# Patient Record
Sex: FEMALE | Race: WHITE | NOT HISPANIC OR LATINO | Employment: UNEMPLOYED | URBAN - METROPOLITAN AREA
[De-identification: names, ages, dates, MRNs, and addresses within clinical notes are randomized per-mention and may not be internally consistent; named-entity substitution may affect disease eponyms.]

---

## 2017-04-11 ENCOUNTER — OFFICE VISIT (OUTPATIENT)
Dept: OBSTETRICS AND GYNECOLOGY | Facility: CLINIC | Age: 27
End: 2017-04-11

## 2017-04-11 VITALS
BODY MASS INDEX: 25.11 KG/M2 | WEIGHT: 133 LBS | HEIGHT: 61 IN | HEART RATE: 96 BPM | SYSTOLIC BLOOD PRESSURE: 111 MMHG | DIASTOLIC BLOOD PRESSURE: 71 MMHG

## 2017-04-11 DIAGNOSIS — Z01.411 ENCOUNTER FOR GYNECOLOGICAL EXAMINATION WITH ABNORMAL FINDING: ICD-10-CM

## 2017-04-11 DIAGNOSIS — N76.1 CHRONIC VAGINITIS: ICD-10-CM

## 2017-04-11 DIAGNOSIS — Z01.419 PAP SMEAR, AS PART OF ROUTINE GYNECOLOGICAL EXAMINATION: Primary | ICD-10-CM

## 2017-04-11 PROCEDURE — 99385 PREV VISIT NEW AGE 18-39: CPT | Performed by: OBSTETRICS & GYNECOLOGY

## 2017-04-11 RX ORDER — ALBUTEROL SULFATE 90 UG/1
AEROSOL, METERED RESPIRATORY (INHALATION)
Refills: 0 | COMMUNITY
Start: 2017-02-06

## 2017-04-11 NOTE — PROGRESS NOTES
"Trista Bhatia is a 27 y.o. female annual exam. C/o vaginal discharge for over 1 yr- no itching or odor.    History of Present Illness    Patient is a 27 y.o. for annual exam. Patient is a new patient. Patient complains of mild vaginal discharge for over a year. Patient denies itching or odor. Patient is interested in IUD.     Health Maintenance   Topic Date Due   • TDAP/TD VACCINES (1 - Tdap) 01/27/2009   • INFLUENZA VACCINE  08/01/2016   • PAP SMEAR  04/11/2017       The following portions of the patient's history were reviewed and updated as appropriate: allergies, current medications, past family history, past medical history, past social history, past surgical history and problem list.    Review of Systems   Genitourinary: Positive for vaginal discharge. Negative for menstrual problem.   All other systems reviewed and are negative.      Vitals:    04/11/17 1000   BP: 111/71   Pulse: 96   Weight: 133 lb (60.3 kg)   Height: 61\" (154.9 cm)       Objective   Physical Exam   Constitutional: She is oriented to person, place, and time. She appears well-developed and well-nourished.   HENT:   Head: Normocephalic and atraumatic.   Eyes: Conjunctivae and EOM are normal.   Neck: Normal range of motion. Neck supple. No thyromegaly present.   Cardiovascular: Normal rate, regular rhythm and normal heart sounds.    Pulmonary/Chest: Effort normal and breath sounds normal. Right breast exhibits no mass, no nipple discharge and no tenderness. Left breast exhibits no mass, no nipple discharge and no tenderness.   Abdominal: Soft. Bowel sounds are normal. There is no hepatosplenomegaly. There is no tenderness. No hernia.   Genitourinary: Rectum normal, vagina normal and uterus normal. Rectal exam shows no external hemorrhoid. There is no rash, tenderness or lesion on the right labia. There is no rash, tenderness or lesion on the left labia. Uterus is not enlarged and not tender. Cervix exhibits no discharge. Right " adnexum displays no mass and no tenderness. Left adnexum displays no mass and no tenderness. No tenderness in the vagina. No vaginal discharge found.   Musculoskeletal: Normal range of motion. She exhibits no edema.   Lymphadenopathy:        Right: No inguinal adenopathy present.        Left: No inguinal adenopathy present.   Neurological: She is alert and oriented to person, place, and time.   Skin: Skin is warm and dry. No rash noted.   Psychiatric: She has a normal mood and affect.   Vitals reviewed.        Assessment/Plan   Brynn was seen today for gynecologic exam and vaginal discharge.    Diagnoses and all orders for this visit:    Pap smear, as part of routine gynecological examination  -     Pap IG, Rfx HPV ASCU    Chronic vaginitis  -     NuSwab VG+    Encounter for gynecological examination with abnormal finding    Will check cultures and follow up according. Risks/benefits of Mirena discussed including bleeding profile, PID, ectopic pregnancy, and uterine perforation. Patient to come in for insertion during her period.

## 2017-04-13 ENCOUNTER — TELEPHONE (OUTPATIENT)
Dept: OBSTETRICS AND GYNECOLOGY | Facility: CLINIC | Age: 27
End: 2017-04-13

## 2017-04-13 LAB
CONV .: NORMAL
CYTOLOGIST CVX/VAG CYTO: NORMAL
CYTOLOGY CVX/VAG DOC THIN PREP: NORMAL
DX ICD CODE: NORMAL
DX ICD CODE: NORMAL
HIV 1 & 2 AB SER-IMP: NORMAL
OTHER STN SPEC: NORMAL
PATH REPORT.FINAL DX SPEC: NORMAL
STAT OF ADQ CVX/VAG CYTO-IMP: NORMAL

## 2017-04-13 RX ORDER — FLUCONAZOLE 150 MG/1
150 TABLET ORAL ONCE
Qty: 1 TABLET | Refills: 0 | Status: SHIPPED | OUTPATIENT
Start: 2017-04-13 | End: 2017-04-13

## 2017-04-13 NOTE — TELEPHONE ENCOUNTER
----- Message from Codi Vides MD sent at 4/13/2017  8:38 AM EDT -----  Please call the patient regarding her abnormal result.  Patient has yeast on her pap smear. Diflucan was sent. It was otherwise normal. I am still waiting on her full culture results.

## 2017-04-15 LAB
A VAGINAE DNA VAG QL NAA+PROBE: ABNORMAL SCORE
BVAB2 DNA VAG QL NAA+PROBE: ABNORMAL SCORE
C ALBICANS DNA VAG QL NAA+PROBE: POSITIVE
C GLABRATA DNA VAG QL NAA+PROBE: NEGATIVE
C TRACH RRNA SPEC QL NAA+PROBE: NEGATIVE
MEGA1 DNA VAG QL NAA+PROBE: ABNORMAL SCORE
N GONORRHOEA RRNA SPEC QL NAA+PROBE: NEGATIVE
T VAGINALIS RRNA SPEC QL NAA+PROBE: NEGATIVE

## 2017-04-21 ENCOUNTER — PROCEDURE VISIT (OUTPATIENT)
Dept: OBSTETRICS AND GYNECOLOGY | Facility: CLINIC | Age: 27
End: 2017-04-21

## 2017-04-21 VITALS
SYSTOLIC BLOOD PRESSURE: 106 MMHG | BODY MASS INDEX: 25.11 KG/M2 | HEIGHT: 61 IN | WEIGHT: 133 LBS | DIASTOLIC BLOOD PRESSURE: 72 MMHG

## 2017-04-21 DIAGNOSIS — Z30.430 ENCOUNTER FOR IUD INSERTION: Primary | ICD-10-CM

## 2017-04-21 LAB
B-HCG UR QL: NEGATIVE
INTERNAL NEGATIVE CONTROL: NEGATIVE
INTERNAL POSITIVE CONTROL: POSITIVE
Lab: NORMAL

## 2017-04-21 PROCEDURE — 58300 INSERT INTRAUTERINE DEVICE: CPT | Performed by: OBSTETRICS & GYNECOLOGY

## 2017-04-21 PROCEDURE — 81025 URINE PREGNANCY TEST: CPT | Performed by: OBSTETRICS & GYNECOLOGY

## 2017-04-21 NOTE — PROGRESS NOTES
Procedure   Procedures   Here for Mirena insertion. Lot # [id number], exp 01/2019 ndc 35453-425-26    Procedure: Mirena intrauterine device insertion    Preoperative diagnosis: desires contraception    Postoperative diagnosis: desires contraception    Anesthesia: none    The risks, benefits, and alternatives to Mirena IUD were explained at length with the patient. All her questions were answered and consents were signed. Pregnancy test was confirmed to be negative.  The patient was placed in a dorsal lithotomy position on the examining table in [facility]. A speculum was inserted into the vagina and the cervix was brought into view. The cervix was prepped with Betadine. The anterior lip was grasped with a single-tooth tenaculum. The endometrial cavity was then sounded to 7 cm. The sealed Mirena package was opened and the Mirena was removed in a sterile fashion.  The upper edge of the depth setting the flange was set at a uterine sound measured. The  was then carefully advanced to the cervical canal into the uterus to the level of the fundus. This was then backed off about 1.5-2 cm to allow sufficient space for the arms to open. The slider was then retracted about 1 cm and the device was deployed. The device was then gently advanced to the fundus. The Mirena was then released by pulling the slider down all the way. The  was removed carefully from the uterus. The threads were then cut leaving 2-3 cm visible outside of the cervix.  The single-tooth tenaculum was removed from the anterior lip. Silver nitrate was applied to the tenaculum sites. Good hemostasis was noted. All other instruments were removed from the vagina. There were no complications, and the patient tolerated the procedure well with a minimal amount of discomfort. The patient was counseled about the need to return in 4 weeks for string check. She was counseled about the need to use a backup method of contraception such as condoms until her  post insertion exam was performed. The patient verbalized understanding that the Mirena will need to be removed/replaced after 5 years. The patient is counseled to contact us if she has any significant or increasing bleeding, pain, fever, chills, or other concerns. She is instructed to see a doctor right away if she believes that she may be pregnant at any time with the Mirena in place.    Assessment/Plan   Brynn was seen today for contraception.    Diagnoses and all orders for this visit:    Encounter for IUD insertion  -     POC Pregnancy, Urine  -     levonorgestrel (MIRENA) 20 MCG/24HR IUD; by Intrauterine route 1 (One) Time.                 Return in about 4 weeks (around 5/19/2017).

## 2017-05-22 ENCOUNTER — OFFICE VISIT (OUTPATIENT)
Dept: OBSTETRICS AND GYNECOLOGY | Facility: CLINIC | Age: 27
End: 2017-05-22

## 2017-05-22 VITALS
HEIGHT: 61 IN | WEIGHT: 139 LBS | DIASTOLIC BLOOD PRESSURE: 67 MMHG | SYSTOLIC BLOOD PRESSURE: 112 MMHG | BODY MASS INDEX: 26.24 KG/M2 | HEART RATE: 80 BPM

## 2017-05-22 DIAGNOSIS — Z30.431 IUD CHECK UP: Primary | ICD-10-CM

## 2017-05-22 DIAGNOSIS — N92.6 IRREGULAR BLEEDING: ICD-10-CM

## 2017-05-22 PROCEDURE — 99213 OFFICE O/P EST LOW 20 MIN: CPT | Performed by: OBSTETRICS & GYNECOLOGY

## 2017-05-22 RX ORDER — DIAZEPAM 5 MG/1
TABLET ORAL
Refills: 1 | COMMUNITY
Start: 2017-05-01

## 2017-11-21 ENCOUNTER — OFFICE VISIT (OUTPATIENT)
Dept: OBSTETRICS AND GYNECOLOGY | Facility: CLINIC | Age: 27
End: 2017-11-21

## 2017-11-21 VITALS
SYSTOLIC BLOOD PRESSURE: 115 MMHG | HEIGHT: 61 IN | HEART RATE: 85 BPM | DIASTOLIC BLOOD PRESSURE: 73 MMHG | BODY MASS INDEX: 29.07 KG/M2 | WEIGHT: 154 LBS

## 2017-11-21 DIAGNOSIS — R10.9 LEFT SIDED ABDOMINAL PAIN: Primary | ICD-10-CM

## 2017-11-21 DIAGNOSIS — Z30.431 IUD CHECK UP: ICD-10-CM

## 2017-11-21 PROCEDURE — 99213 OFFICE O/P EST LOW 20 MIN: CPT | Performed by: OBSTETRICS & GYNECOLOGY

## 2017-11-21 NOTE — PROGRESS NOTES
Subjective   Brynn Bhatia is a 27 y.o. female     History of Present Illness  Pt here for pain on left abdominal side.   Reports pain started about a week ago.  It is worse with coughing or laughing.  She has not noticed any difference with positional changes.  She denies any abnormal vaginal bleeding.  She has had a Mirena IUD inserted in 2017, and she has had no period since approximately 2 months following placement.  She reports mild radiation of the pain to the right side.  No radiation to the back.  She denies fevers or chills.  She denies any constipation, diarrhea, nausea, vomiting.  She has not had a new sexual partner and denies any risk factors for STDs.  She was told in the past she produced excess cervical mucus.  She denies any dysuria.  Did try nightly dose of ibuprofen but this did not change the symptoms.  She is happy with the Mirena IUD.  She did check for the strings last month and noted they seem to be normal.  She is in the middle of separation from her partner of 10 years.  Has an 8-year-old 3-year-old her having difficulty going through the separation    Reports her mom had cervical cancer in her 20s.  Also reports her grandmother recently  of ovarian cancer.  She is unsure what kind of ovarian cancer.  Her grandmother was in her 70s.    Past Medical History:   Diagnosis Date   • Asthma      History reviewed. No pertinent surgical history.  Social History   Substance Use Topics   • Smoking status: Never Smoker   • Smokeless tobacco: Never Used   • Alcohol use No     Family History   Problem Relation Age of Onset   • Seizures Mother    • Cervical cancer Mother    • Cervical cancer Maternal Grandmother          Review of Systems   Constitutional: Negative for chills, fever and unexpected weight change.   HENT: Negative for congestion, nosebleeds and sore throat.    Eyes: Negative for visual disturbance.   Respiratory: Negative for cough, chest tightness and shortness of breath.     Cardiovascular: Negative for chest pain and palpitations.   Gastrointestinal: Positive for abdominal pain (see HPI). Negative for constipation, diarrhea, nausea and vomiting.   Endocrine: Negative for polyuria.   Genitourinary: Positive for vaginal discharge (no odor). Negative for difficulty urinating, dyspareunia, dysuria, frequency, genital sores, hematuria, menstrual problem, pelvic pain, urgency, vaginal bleeding and vaginal pain.   Musculoskeletal: Negative for arthralgias and joint swelling.   Skin: Negative for color change and rash.   Neurological: Negative for dizziness, light-headedness and headaches.   Hematological: Does not bruise/bleed easily.   Psychiatric/Behavioral: Negative for dysphoric mood. The patient is not nervous/anxious.        Objective   Physical Exam   Constitutional: She is oriented to person, place, and time. She appears well-developed and well-nourished.   HENT:   Head: Normocephalic and atraumatic.   Eyes: EOM are normal. No scleral icterus.   Neck: Normal range of motion.   Cardiovascular: Normal rate.    Pulmonary/Chest: Effort normal. No respiratory distress.   Abdominal: Soft.   + straight leg raise test on left and bilaterally   Genitourinary: Uterus normal. There is no rash, tenderness or lesion on the right labia. There is no rash, tenderness or lesion on the left labia. Uterus is not deviated, not enlarged and not tender. Cervix exhibits discharge (IUD strings seen). Cervix exhibits no motion tenderness and no friability. Right adnexum displays no mass, no tenderness and no fullness. Left adnexum displays no mass, no tenderness and no fullness. Vaginal discharge found.   Neurological: She is alert and oriented to person, place, and time.   Skin: Skin is warm and dry.   Psychiatric: She has a normal mood and affect. Her behavior is normal.         Assessment/Plan   Problems Addressed this Visit        Other    IUD check up      Other Visit Diagnoses     Left sided abdominal  pain    -  Primary    Relevant Orders    NuSwab VG+ - Swab, Vagina       She was counseled on the possible etiologies of this pain.  Her exam was overall benign.  Given the exacerbation with coughing and laughing, as well as the positive straight leg test I suspect she might have a musculoskeletal component.  Given findings of vaginal discharge sent NuSwab to rule out cervicits.  Do not suspect PID based on exam.  We discussed that with the Mirena IUD can develop ovarian cysts.  Typically the cyst resolved in the course of her menstrual cycle.  Patient is nervous about the possibility of ovarian cysts given her family history.  I recommended a follow-up with a transvaginal ultrasound to both assess the ovaries and the position of the IUD.  Based on pelvic exam, the IUD strings are appropriate.  The meantime I did recommend a 3-5 day course scheduled NSAIDs.  He was given routine abdominal pain precautions including seek immediate medical evaluation with fever, acute onset of pain, intractable nausea and/or vomiting, other concerning sign or symptom.  If no resolution of pain in 4-6 weeks recommend follow up in office.    Follow up for annual exam April 2018

## 2017-11-26 RX ORDER — FLUCONAZOLE 150 MG/1
150 TABLET ORAL ONCE
Qty: 1 TABLET | Refills: 0 | Status: SHIPPED | OUTPATIENT
Start: 2017-11-26 | End: 2017-11-26

## 2017-11-27 ENCOUNTER — TELEPHONE (OUTPATIENT)
Dept: OBSTETRICS AND GYNECOLOGY | Facility: CLINIC | Age: 27
End: 2017-11-27

## 2017-11-27 NOTE — TELEPHONE ENCOUNTER
----- Message from Sigrid Lomax MD sent at 11/26/2017  7:40 PM EST -----  Please call patient and let her know her testing came back positive for yeast.  She may use a topical OTC medication if she prefers.  I will send one dose of diflucan to her pharmacy.  Thank you!

## 2017-11-28 ENCOUNTER — TELEPHONE (OUTPATIENT)
Dept: OBSTETRICS AND GYNECOLOGY | Facility: CLINIC | Age: 27
End: 2017-11-28

## 2017-11-28 ENCOUNTER — PROCEDURE VISIT (OUTPATIENT)
Dept: OBSTETRICS AND GYNECOLOGY | Facility: CLINIC | Age: 27
End: 2017-11-28

## 2017-11-28 DIAGNOSIS — N83.209 HEMORRHAGIC CYST OF OVARY: ICD-10-CM

## 2017-11-28 DIAGNOSIS — Z97.5 IUD (INTRAUTERINE DEVICE) IN PLACE: ICD-10-CM

## 2017-11-28 DIAGNOSIS — R10.2 PELVIC PAIN IN FEMALE: Primary | ICD-10-CM

## 2017-11-28 PROCEDURE — 76830 TRANSVAGINAL US NON-OB: CPT | Performed by: OBSTETRICS & GYNECOLOGY

## 2017-11-28 NOTE — TELEPHONE ENCOUNTER
Called patient to review ultrasound results.  Her transvaginal ultrasound showed a resolving left hemorrhagic ovarian cyst that was 14.6mm x 10.0mm and IUD in appropriate position, normal right ovary.  We discussed that typically these cysts will resolve over the next few cycles.  If pain worsens or fails to improve we should re-examine. Patient expressed understanding. No further questions at this time.

## 2017-12-04 ENCOUNTER — TELEPHONE (OUTPATIENT)
Dept: OBSTETRICS AND GYNECOLOGY | Facility: CLINIC | Age: 27
End: 2017-12-04

## 2017-12-04 NOTE — TELEPHONE ENCOUNTER
----- Message from Cynthia Reyer sent at 12/4/2017  8:55 AM EST -----  Pt called stating on Saturday she started having severe pain and it is still severe. She thinks it is related to her cyst. She says on a scale of 1-10, it ranges from a 9 to 10. Please advise. E [phone]    12/04/17 1:29 PM   If pain is that severe, she should go to ED for evaluation.  Typically with the size of her cyst (approx 1.5x1cm) the pain would not be this severe, so we need to make sure it is not happening for another reason.  Thanks!

## 2017-12-04 NOTE — TELEPHONE ENCOUNTER
----- Message from Cynthia Reyer sent at 12/4/2017  8:55 AM EST -----  Pt called stating on Saturday she started having severe pain and it is still severe. She thinks it is related to her cyst. She says on a scale of 1-10, it ranges from a 9 to 10. Please advise. E [phone]    12/04/17 5:21 PM   Patient reports that she has felt bloated and diffuse sharp, stabbing pain all over her abdomen.  She thinks she may have some swelling of her legs.  Felt decreased appetite.  No nausea.  Bowel movement yesterday was normal, no constipation.  Reports that she has been bleeding, changing a pad 2-3 times per day, pretty soiled but not saturated.  Has not had much bleeding since the IUD insertion.    Counseled patient that she should go to ED with nausea/vomiting, fever, severe pain, heavy bleeding or other concerning sign/symptom. She expresses understanding.  We discussed that sometimes cysts can rupture and cause pain but that is usually short lived.  I cautioned her that if alarming symptoms arise, she should seek immediate medical evaluation to make sure this is not ovarian torsion, appendicitis, or other concerning findings.  Offered patient evaluation in the office which she declines for now.

## 2017-12-05 ENCOUNTER — HOSPITAL ENCOUNTER (EMERGENCY)
Facility: HOSPITAL | Age: 27
Discharge: HOME | End: 2017-12-06
Attending: EMERGENCY MEDICINE | Admitting: EMERGENCY MEDICINE

## 2017-12-05 DIAGNOSIS — B96.89 BV (BACTERIAL VAGINOSIS): ICD-10-CM

## 2017-12-05 DIAGNOSIS — N92.6 IRREGULAR BLEEDING: ICD-10-CM

## 2017-12-05 DIAGNOSIS — R10.32 ABDOMINAL PAIN, ACUTE, LEFT LOWER QUADRANT: Primary | ICD-10-CM

## 2017-12-05 DIAGNOSIS — N76.0 BV (BACTERIAL VAGINOSIS): ICD-10-CM

## 2017-12-05 PROCEDURE — 85025 COMPLETE CBC W/AUTO DIFF WBC: CPT | Performed by: EMERGENCY MEDICINE

## 2017-12-05 PROCEDURE — 80053 COMPREHEN METABOLIC PANEL: CPT | Performed by: EMERGENCY MEDICINE

## 2017-12-05 PROCEDURE — 96361 HYDRATE IV INFUSION ADD-ON: CPT

## 2017-12-05 PROCEDURE — 81001 URINALYSIS AUTO W/SCOPE: CPT | Performed by: EMERGENCY MEDICINE

## 2017-12-05 PROCEDURE — 99283 EMERGENCY DEPT VISIT LOW MDM: CPT

## 2017-12-05 RX ADMIN — SODIUM CHLORIDE 1000 ML: 9 INJECTION, SOLUTION INTRAVENOUS at 23:55

## 2017-12-06 ENCOUNTER — APPOINTMENT (OUTPATIENT)
Dept: CT IMAGING | Facility: HOSPITAL | Age: 27
End: 2017-12-06

## 2017-12-06 VITALS
BODY MASS INDEX: 28.32 KG/M2 | WEIGHT: 150 LBS | DIASTOLIC BLOOD PRESSURE: 71 MMHG | SYSTOLIC BLOOD PRESSURE: 103 MMHG | TEMPERATURE: 96.7 F | RESPIRATION RATE: 18 BRPM | OXYGEN SATURATION: 100 % | HEIGHT: 61 IN | HEART RATE: 54 BPM

## 2017-12-06 LAB
ALBUMIN SERPL-MCNC: 4.2 G/DL (ref 3.5–5.2)
ALBUMIN/GLOB SERPL: 1.3 G/DL
ALP SERPL-CCNC: 71 U/L (ref 39–117)
ALT SERPL W P-5'-P-CCNC: 12 U/L (ref 1–33)
ANION GAP SERPL CALCULATED.3IONS-SCNC: 8.4 MMOL/L
AST SERPL-CCNC: 15 U/L (ref 1–32)
BACTERIA UR QL AUTO: NORMAL /HPF
BASOPHILS # BLD AUTO: 0.07 10*3/MM3 (ref 0–0.2)
BASOPHILS NFR BLD AUTO: 0.8 % (ref 0–1.5)
BILIRUB SERPL-MCNC: 0.3 MG/DL (ref 0.1–1.2)
BILIRUB UR QL STRIP: NEGATIVE
BUN BLD-MCNC: 15 MG/DL (ref 6–20)
BUN/CREAT SERPL: 16.1 (ref 7–25)
CALCIUM SPEC-SCNC: 9.1 MG/DL (ref 8.6–10.5)
CHLORIDE SERPL-SCNC: 104 MMOL/L (ref 98–107)
CLARITY UR: CLEAR
CO2 SERPL-SCNC: 29.6 MMOL/L (ref 22–29)
COLOR UR: YELLOW
CREAT BLD-MCNC: 0.93 MG/DL (ref 0.57–1)
DEPRECATED RDW RBC AUTO: 40.9 FL (ref 37–54)
EOSINOPHIL # BLD AUTO: 0.33 10*3/MM3 (ref 0–0.7)
EOSINOPHIL NFR BLD AUTO: 4 % (ref 0.3–6.2)
ERYTHROCYTE [DISTWIDTH] IN BLOOD BY AUTOMATED COUNT: 12 % (ref 11.7–13)
GFR SERPL CREATININE-BSD FRML MDRD: 72 ML/MIN/1.73
GLOBULIN UR ELPH-MCNC: 3.3 GM/DL
GLUCOSE BLD-MCNC: 84 MG/DL (ref 65–99)
GLUCOSE UR STRIP-MCNC: NEGATIVE MG/DL
HCT VFR BLD AUTO: 41.5 % (ref 35.6–45.5)
HGB BLD-MCNC: 13.5 G/DL (ref 11.9–15.5)
HGB UR QL STRIP.AUTO: ABNORMAL
HYALINE CASTS UR QL AUTO: NORMAL /LPF
IMM GRANULOCYTES # BLD: 0 10*3/MM3 (ref 0–0.03)
IMM GRANULOCYTES NFR BLD: 0 % (ref 0–0.5)
KETONES UR QL STRIP: NEGATIVE
LEUKOCYTE ESTERASE UR QL STRIP.AUTO: NEGATIVE
LYMPHOCYTES # BLD AUTO: 3.33 10*3/MM3 (ref 0.9–4.8)
LYMPHOCYTES NFR BLD AUTO: 39.9 % (ref 19.6–45.3)
MCH RBC QN AUTO: 30.4 PG (ref 26.9–32)
MCHC RBC AUTO-ENTMCNC: 32.5 G/DL (ref 32.4–36.3)
MCV RBC AUTO: 93.5 FL (ref 80.5–98.2)
MONOCYTES # BLD AUTO: 0.53 10*3/MM3 (ref 0.2–1.2)
MONOCYTES NFR BLD AUTO: 6.3 % (ref 5–12)
NEUTROPHILS # BLD AUTO: 4.09 10*3/MM3 (ref 1.9–8.1)
NEUTROPHILS NFR BLD AUTO: 49 % (ref 42.7–76)
NITRITE UR QL STRIP: NEGATIVE
PH UR STRIP.AUTO: 6.5 [PH] (ref 5–8)
PLATELET # BLD AUTO: 285 10*3/MM3 (ref 140–500)
PMV BLD AUTO: 10.1 FL (ref 6–12)
POTASSIUM BLD-SCNC: 3.6 MMOL/L (ref 3.5–5.2)
PROT SERPL-MCNC: 7.5 G/DL (ref 6–8.5)
PROT UR QL STRIP: NEGATIVE
RBC # BLD AUTO: 4.44 10*6/MM3 (ref 3.9–5.2)
RBC # UR: NORMAL /HPF
REF LAB TEST METHOD: NORMAL
SODIUM BLD-SCNC: 142 MMOL/L (ref 136–145)
SP GR UR STRIP: 1.02 (ref 1–1.03)
SQUAMOUS #/AREA URNS HPF: NORMAL /HPF
UROBILINOGEN UR QL STRIP: ABNORMAL
WBC NRBC COR # BLD: 8.35 10*3/MM3 (ref 4.5–10.7)
WBC UR QL AUTO: NORMAL /HPF

## 2017-12-06 PROCEDURE — 96375 TX/PRO/DX INJ NEW DRUG ADDON: CPT

## 2017-12-06 PROCEDURE — 0 IOPAMIDOL 61 % SOLUTION: Performed by: EMERGENCY MEDICINE

## 2017-12-06 PROCEDURE — 25010000002 ONDANSETRON PER 1 MG: Performed by: EMERGENCY MEDICINE

## 2017-12-06 PROCEDURE — 25010000002 KETOROLAC TROMETHAMINE PER 15 MG: Performed by: EMERGENCY MEDICINE

## 2017-12-06 PROCEDURE — 96374 THER/PROPH/DIAG INJ IV PUSH: CPT

## 2017-12-06 PROCEDURE — 74177 CT ABD & PELVIS W/CONTRAST: CPT

## 2017-12-06 RX ORDER — HYDROMORPHONE HYDROCHLORIDE 1 MG/ML
0.5 INJECTION, SOLUTION INTRAMUSCULAR; INTRAVENOUS; SUBCUTANEOUS ONCE
Status: DISCONTINUED | OUTPATIENT
Start: 2017-12-06 | End: 2017-12-06 | Stop reason: HOSPADM

## 2017-12-06 RX ORDER — ONDANSETRON 2 MG/ML
4 INJECTION INTRAMUSCULAR; INTRAVENOUS ONCE
Status: COMPLETED | OUTPATIENT
Start: 2017-12-06 | End: 2017-12-06

## 2017-12-06 RX ORDER — DICYCLOMINE HYDROCHLORIDE 10 MG/1
10 CAPSULE ORAL
Qty: 30 CAPSULE | Refills: 0 | Status: SHIPPED | OUTPATIENT
Start: 2017-12-06 | End: 2019-03-26

## 2017-12-06 RX ORDER — METRONIDAZOLE 500 MG/1
500 TABLET ORAL 2 TIMES DAILY
Qty: 14 TABLET | Refills: 0 | Status: SHIPPED | OUTPATIENT
Start: 2017-12-06 | End: 2019-03-26

## 2017-12-06 RX ORDER — DOCUSATE SODIUM 100 MG/1
100 CAPSULE, LIQUID FILLED ORAL 2 TIMES DAILY PRN
Qty: 12 CAPSULE | Refills: 0 | Status: SHIPPED | OUTPATIENT
Start: 2017-12-06

## 2017-12-06 RX ORDER — KETOROLAC TROMETHAMINE 15 MG/ML
15 INJECTION, SOLUTION INTRAMUSCULAR; INTRAVENOUS ONCE
Status: COMPLETED | OUTPATIENT
Start: 2017-12-06 | End: 2017-12-06

## 2017-12-06 RX ADMIN — ONDANSETRON 4 MG: 2 INJECTION INTRAMUSCULAR; INTRAVENOUS at 00:49

## 2017-12-06 RX ADMIN — IOPAMIDOL 85 ML: 612 INJECTION, SOLUTION INTRAVENOUS at 00:44

## 2017-12-06 RX ADMIN — KETOROLAC TROMETHAMINE 15 MG: 15 INJECTION, SOLUTION INTRAMUSCULAR; INTRAVENOUS at 00:52

## 2017-12-06 NOTE — ED NOTES
"Pt reports bright red vaginal bleeding with clots. Today she has gone through \"3 not thick pads\" that have been saturated. She also is concerned about \"bloating\" and cervical cancer being in the family. OBGYN dx cyst and plans to \"watch it.\" Pt had mirena placed in august.  Reassurance given; call light in reach. Pts breathing even and unlabored. Pt appears in NAD at this time. Family at bedside.      Pallavi Carmona RN  12/05/17 1212    "

## 2017-12-06 NOTE — ED TRIAGE NOTES
Known ovarian cyst on left dx 1 week ago, pain in left pelvic area started on Saturday woth vaginal bleeding, painful urination, nausea

## 2017-12-06 NOTE — ED PROVIDER NOTES
" EMERGENCY DEPARTMENT ENCOUNTER    CHIEF COMPLAINT  Chief Complaint: Abd pain  History given by: Pt  History limited by: Nothing  Room Number: 21/21  PMD: Shabbir Thurman MD      HPI:  Pt is a 27 y.o. female who presents complaining of LLQ pain. The pt was diagnosed with an ovarian cyst on the left side 1 week ago. The pt confirms nausea, painful urination, and vaginal bleeding.       Duration: 3 days  Onset: Gradual  Timing: Constant  Location: LLQ  Radiation: None  Quality: \"pain\"  Intensity/Severity: Moderate  Progression: No change  Associated Symptoms: Painful urination, nausea, and vaginal bleeding  Aggravating Factors: Unknown  Alleviating Factors: Unknown  Previous Episodes: None  Treatment before arrival: None    PAST MEDICAL HISTORY  Active Ambulatory Problems     Diagnosis Date Noted   • Encounter for gynecological examination with abnormal finding 04/11/2017   • Encounter for IUD insertion 04/21/2017   • IUD check up 05/22/2017   • Irregular bleeding 05/22/2017     Resolved Ambulatory Problems     Diagnosis Date Noted   • No Resolved Ambulatory Problems     Past Medical History:   Diagnosis Date   • Asthma        PAST SURGICAL HISTORY  History reviewed. No pertinent surgical history.    FAMILY HISTORY  Family History   Problem Relation Age of Onset   • Seizures Mother    • Cervical cancer Mother    • Cervical cancer Maternal Grandmother        SOCIAL HISTORY  Social History     Social History   • Marital status: Single     Spouse name: N/A   • Number of children: N/A   • Years of education: N/A     Occupational History   • Not on file.     Social History Main Topics   • Smoking status: Never Smoker   • Smokeless tobacco: Never Used   • Alcohol use No   • Drug use: No   • Sexual activity: Yes     Partners: Male     Birth control/ protection: None     Other Topics Concern   • Not on file     Social History Narrative       ALLERGIES  Sulfa antibiotics    REVIEW OF SYSTEMS  Review of Systems "   Constitutional: Negative for fever.   HENT: Negative for sore throat.    Eyes: Negative.    Respiratory: Negative for cough and shortness of breath.    Cardiovascular: Negative for chest pain.   Gastrointestinal: Positive for abdominal pain and nausea. Negative for diarrhea and vomiting.   Genitourinary: Positive for dysuria and vaginal bleeding.   Musculoskeletal: Negative for neck pain.   Skin: Negative for rash.   Allergic/Immunologic: Negative.    Neurological: Negative for weakness, numbness and headaches.   Hematological: Negative.    Psychiatric/Behavioral: Negative.    All other systems reviewed and are negative.      PHYSICAL EXAM  ED Triage Vitals   Temp Heart Rate Resp BP SpO2   12/05/17 2329 12/05/17 2329 12/05/17 2329 -- 12/05/17 2329   96.7 °F (35.9 °C) 78 18  97 %      Temp src Heart Rate Source Patient Position BP Location FiO2 (%)   12/05/17 2329 -- -- -- --   Tympanic           Physical Exam   Constitutional: She is oriented to person, place, and time and well-developed, well-nourished, and in no distress. No distress.   HENT:   Head: Normocephalic and atraumatic.   Eyes: EOM are normal. Pupils are equal, round, and reactive to light.   Neck: Normal range of motion. Neck supple.   Cardiovascular: Normal rate, regular rhythm and normal heart sounds.    Pulmonary/Chest: Effort normal and breath sounds normal. No respiratory distress.   Abdominal: Soft. There is tenderness (Mild) in the left lower quadrant. There is no rebound and no guarding.   Musculoskeletal: Normal range of motion. She exhibits no edema.   Neurological: She is alert and oriented to person, place, and time. She has normal sensation and normal strength.   Skin: Skin is warm and dry. No rash noted.   Psychiatric: Mood and affect normal.   Nursing note and vitals reviewed.      LAB RESULTS  Lab Results (last 24 hours)     Procedure Component Value Units Date/Time    CBC & Differential [[id number]] Collected:  12/05/17 5754     Specimen:  Blood Updated:  12/06/17 0010    Narrative:       The following orders were created for panel order CBC & Differential.  Procedure                               Abnormality         Status                     ---------                               -----------         ------                     CBC Auto Differential[[id number]]        Normal              Final result                 Please view results for these tests on the individual orders.    Comprehensive Metabolic Panel [[id number]]  (Abnormal) Collected:  12/05/17 2357    Specimen:  Blood Updated:  12/06/17 0027     Glucose 84 mg/dL      BUN 15 mg/dL      Creatinine 0.93 mg/dL      Sodium 142 mmol/L      Potassium 3.6 mmol/L      Chloride 104 mmol/L      CO2 29.6 (H) mmol/L      Calcium 9.1 mg/dL      Total Protein 7.5 g/dL      Albumin 4.20 g/dL      ALT (SGPT) 12 U/L      AST (SGOT) 15 U/L      Alkaline Phosphatase 71 U/L      Total Bilirubin 0.3 mg/dL      eGFR Non African Amer 72 mL/min/1.73      Globulin 3.3 gm/dL      A/G Ratio 1.3 g/dL      BUN/Creatinine Ratio 16.1     Anion Gap 8.4 mmol/L     Urinalysis With / Culture If Indicated - Urine, Clean Catch [[id number]]  (Abnormal) Collected:  12/05/17 2357    Specimen:  Urine from Urine, Clean Catch Updated:  12/06/17 0008     Color, UA Yellow     Appearance, UA Clear     pH, UA 6.5     Specific Gravity, UA 1.019     Glucose, UA Negative     Ketones, UA Negative     Bilirubin, UA Negative     Blood, UA Moderate (2+) (A)     Protein, UA Negative     Leuk Esterase, UA Negative     Nitrite, UA Negative     Urobilinogen, UA 1.0 E.U./dL    CBC Auto Differential [[id number]]  (Normal) Collected:  12/05/17 2357    Specimen:  Blood Updated:  12/06/17 0010     WBC 8.35 10*3/mm3      RBC 4.44 10*6/mm3      Hemoglobin 13.5 g/dL      Hematocrit 41.5 %      MCV 93.5 fL      MCH 30.4 pg      MCHC 32.5 g/dL      RDW 12.0 %      RDW-SD 40.9 fl      MPV 10.1 fL      Platelets 285 10*3/mm3      Neutrophil % 49.0 %       Lymphocyte % 39.9 %      Monocyte % 6.3 %      Eosinophil % 4.0 %      Basophil % 0.8 %      Immature Grans % 0.0 %      Neutrophils, Absolute 4.09 10*3/mm3      Lymphocytes, Absolute 3.33 10*3/mm3      Monocytes, Absolute 0.53 10*3/mm3      Eosinophils, Absolute 0.33 10*3/mm3      Basophils, Absolute 0.07 10*3/mm3      Immature Grans, Absolute 0.00 10*3/mm3     Urinalysis, Microscopic Only - Urine, Clean Catch [[id number]] Collected:  12/05/17 2357    Specimen:  Urine from Urine, Clean Catch Updated:  12/06/17 0008     RBC, UA 0-2 /HPF      WBC, UA 0-2 /HPF      Bacteria, UA None Seen /HPF      Squamous Epithelial Cells, UA 0-2 /HPF      Hyaline Casts, UA 0-2 /LPF      Methodology Automated Microscopy          I ordered the above labs and reviewed the results    RADIOLOGY  CT Abdomen Pelvis With Contrast   Final Result   IMPRESSION :    1. Severe constipation without obstruction, otherwise unremarkable                           This study was performed with techniques to keep radiation doses as low   as reasonably achievable (ALARA). Individualized dose reduction   techniques using automated exposure control or adjustment of mA and/or   kV according to the patient size were employed.        This report was finalized on 12/6/2017 12:49 AM by Eliseo Gray MD.               I ordered the above noted radiological studies. Interpreted by radiologist. Reviewed by me in PACS.       PROCEDURES  Procedures      PROGRESS AND CONSULTS  ED Course   2342 Ordered CBC, UA, CMP, and CT abd/pelvis for further evaluation. Ordered equipment for pelvic exam. Ordered 1L of fluid for further evaluation.    0057 Rechecked the patient and she is resting. Discussed pertinent imaging results, including CT abd/pelvis which showed that the pt is constipated and showed and in place IUD. Discussed the plan to discharge the pt with a stool softener and pain medication. Patient agrees with plan and all questions were addressed.     0112  Discussed the pt's bacterial vaginosis which could be causing her vaginal bleeding. The pt understands and all questions were addressed.      MEDICAL DECISION MAKING  Results were reviewed/discussed with the patient and they were also made aware of online access. Pt also made aware that some labs, such as cultures, will not be resulted during ER visit and follow up with PMD is necessary.     MDM  Number of Diagnoses or Management Options     Amount and/or Complexity of Data Reviewed  Clinical lab tests: ordered and reviewed (UA: moderate blood in urine)  Tests in the radiology section of CPT®: ordered and reviewed (CT abd/pelvis: severe constipation)           DIAGNOSIS  Final diagnoses:   Abdominal pain, acute, left lower quadrant   Irregular bleeding   BV (bacterial vaginosis)       DISPOSITION  Disposition: Discharged.    Patient discharged in stable condition.    Reviewed implications of results, diagnosis, meds, responsibility to follow up, warning signs and symptoms of possible worsening, potential complications and reasons to return to ER, including new or worsening symptoms.    Patient/Family voiced understanding of above instructions.    Discussed plan for discharge, as there is no emergent indication for admission.  Pt/family is agreeable and understands need for follow up and repeat testing.  Pt is aware that discharge does not mean that nothing is wrong but it indicates no emergency is present and they must continue care with follow-up as given below or physician of their choice.     FOLLOW-UP  Shabbir Thurman MD  [address] [place] [address]  [phone]    Schedule an appointment as soon as possible for a visit in 2 days  If symptoms worsen         Medication List      New Prescriptions          dicyclomine 10 MG capsule   Commonly known as:  BENTYL   Take 1 capsule by mouth 4 (Four) Times a Day Before Meals & at Bedtime.       docusate sodium 100 MG capsule   Commonly known as:   COLACE   Take 1 capsule by mouth 2 (Two) Times a Day As Needed for Constipation.       metroNIDAZOLE 500 MG tablet   Commonly known as:  FLAGYL   Take 1 tablet by mouth 2 (Two) Times a Day.             Latest Documented Vital Signs:  As of 1:22 AM  BP- 106/76 HR- 78 Temp- 96.7 °F (35.9 °C) (Tympanic) O2 sat- 100%    --  Documentation assistance provided by avelino Dean for Dr. Ball.  Information recorded by the scribe was done at my direction and has been verified and validated by me.     Mely Dean  12/06/17 0015       Mely Dean  12/06/17 0106       Mely Dean  12/06/17 0122       Jourdan Ball MD  12/06/17 0158

## 2019-03-26 ENCOUNTER — OFFICE VISIT (OUTPATIENT)
Dept: OBSTETRICS AND GYNECOLOGY | Facility: CLINIC | Age: 29
End: 2019-03-26

## 2019-03-26 VITALS
BODY MASS INDEX: 29.83 KG/M2 | HEART RATE: 77 BPM | WEIGHT: 158 LBS | DIASTOLIC BLOOD PRESSURE: 69 MMHG | HEIGHT: 61 IN | SYSTOLIC BLOOD PRESSURE: 112 MMHG

## 2019-03-26 DIAGNOSIS — Z01.419 WELL WOMAN EXAM WITH ROUTINE GYNECOLOGICAL EXAM: Primary | ICD-10-CM

## 2019-03-26 PROCEDURE — 99395 PREV VISIT EST AGE 18-39: CPT | Performed by: OBSTETRICS & GYNECOLOGY

## 2019-03-26 RX ORDER — MULTIVITAMIN WITH FOLIC ACID 400 MCG
TABLET ORAL DAILY
Refills: 1 | COMMUNITY
Start: 2019-02-15

## 2019-03-26 NOTE — PROGRESS NOTES
Chief Complaint   Patient presents with   • Annual Exam     last pap: 17 BIANCA Bhatia is a 29 y.o.  who presents for an annual examination     Pap history:  Last pap: 2017- for intraepithelial lesion or malignancy  Prior abnormal paps: no  STDs  Sexually active: yes  History of STDs: no  Has had HPV vaccine: unknown  Contraception:  IUD placed in 2017    History of physical abuse: no, History of sexual abuse: no and History of verbal/emotional abuse: no    Screening for BRCA-   Is patient's family history significant for BRCA risk factors? no    Past Medical History:   Diagnosis Date   • Asthma      History reviewed. No pertinent surgical history.  OB History    Para Term  AB Living   2 2 2     2   SAB TAB Ectopic Molar Multiple Live Births             2      # Outcome Date GA Lbr Klever/2nd Weight Sex Delivery Anes PTL Lv   2 Term     F Vag-Spont   ELISHA   1 Term     F Vag-Spont   ELISHA         Social History     Tobacco Use   • Smoking status: Never Smoker   • Smokeless tobacco: Never Used   Substance Use Topics   • Alcohol use: Yes     Comment: rare   • Drug use: No     Family History   Problem Relation Age of Onset   • Seizures Mother    • Cervical cancer Mother    • Ovarian cancer Mother         30's   • Cervical cancer Maternal Grandmother    • Breast cancer Neg Hx    • Uterine cancer Neg Hx    • Colon cancer Neg Hx    • Deep vein thrombosis Neg Hx    • Pulmonary embolism Neg Hx      Current Outpatient Medications on File Prior to Visit   Medication Sig Dispense Refill   • diazePAM (VALIUM) 5 MG tablet TK 1 T PO  D PRN  1   • docusate sodium (COLACE) 100 MG capsule Take 1 capsule by mouth 2 (Two) Times a Day As Needed for Constipation. 12 capsule 0   • levonorgestrel (MIRENA, 52 MG,) 20 MCG/24HR IUD Mirena     • multivitamin (DAILY LANE) tablet tablet Take  by mouth Daily.  1   • VENTOLIN  (90 BASE) MCG/ACT inhaler USE 1 TO 2 PUFFS EVERY 4 H PRF WHEEZE OR  "SHORTNESS OF BREATH  0   • [DISCONTINUED] dicyclomine (BENTYL) 10 MG capsule Take 1 capsule by mouth 4 (Four) Times a Day Before Meals & at Bedtime. 30 capsule 0   • [DISCONTINUED] metroNIDAZOLE (FLAGYL) 500 MG tablet Take 1 tablet by mouth 2 (Two) Times a Day. 14 tablet 0     Current Facility-Administered Medications on File Prior to Visit   Medication Dose Route Frequency Provider Last Rate Last Dose   • levonorgestrel (MIRENA) 20 MCG/24HR IUD   Intrauterine Once Codi Almodovar MD         Allergies   Allergen Reactions   • Sulfa Antibiotics Other (See Comments)     Pt states \"as a baby\"         Review of Systems   Constitutional: Negative for chills, fever and unexpected weight change.   HENT: Negative for congestion, nosebleeds and sore throat.    Eyes: Negative for visual disturbance.   Respiratory: Negative for cough, chest tightness and shortness of breath.    Cardiovascular: Negative for chest pain and palpitations.   Gastrointestinal: Negative for abdominal pain, constipation, diarrhea, nausea and vomiting.   Endocrine: Negative for polyuria.   Genitourinary: Negative for difficulty urinating, dyspareunia, dysuria, frequency, genital sores, hematuria, menstrual problem, pelvic pain, urgency, vaginal bleeding, vaginal discharge and vaginal pain.   Musculoskeletal: Negative for arthralgias and joint swelling.   Skin: Negative for color change and rash.   Neurological: Negative for dizziness, light-headedness and headaches.   Hematological: Does not bruise/bleed easily.   Psychiatric/Behavioral: Negative for dysphoric mood. The patient is not nervous/anxious.          OBJECTIVE:   Vitals:    03/26/19 1103   BP: 112/69   Pulse: 77   Weight: 71.7 kg (158 lb)   Height: 154.9 cm (61\")      Physical Exam   Constitutional: She is oriented to person, place, and time. She appears well-developed and well-nourished. No distress.   HENT:   Head: Normocephalic and atraumatic.   Eyes: EOM are normal. No scleral " icterus.   Neck: Normal range of motion. Neck supple. No thyromegaly present.   Cardiovascular: Normal rate and regular rhythm. Exam reveals no gallop and no friction rub.   No murmur heard.  Pulmonary/Chest: Effort normal and breath sounds normal. No respiratory distress. She has no wheezes. She has no rales. She exhibits no tenderness. Right breast exhibits no inverted nipple. Left breast exhibits no inverted nipple. Breasts are symmetrical. There is no breast swelling.   Abdominal: Soft. Bowel sounds are normal. She exhibits no distension. There is no tenderness. There is no guarding.   Genitourinary: Vagina normal and uterus normal. There is no rash, tenderness, lesion, injury or Bartholin's cyst on the right labia. There is no rash, tenderness, lesion, injury or Bartholin's cyst on the left labia. Uterus is not mobile.   Cervix is not parous. Cervix does not exhibit motion tenderness, discharge, friability, lesion, polyp, nabothian cyst, eversion, pinkness or cyanosis. Right adnexum displays no mass, no tenderness and no fullness. Right adnexum is present.Left adnexum displays no mass, no tenderness and no fullness. Left adnexum is present.No vaginal discharge found.   Musculoskeletal: She exhibits no edema or deformity.   Neurological: She is alert and oriented to person, place, and time.   Skin: Skin is warm and dry. She is not diaphoretic.   Psychiatric: She has a normal mood and affect. Her speech is normal and behavior is normal. Judgment and thought content normal. Cognition and memory are normal.       ASSESSMENT/PLAN:     Well woman counseling/screening:    Cervical cancer screening:    Denies h/o cervical dysplasia in past   HPV vaccination does not believe completed   The patient is due for a pap in 2020.    Screening guidelines discussed with patient  Breast cancer screening:    Clinical breast exam recommended for age 20-39 years every 1-3 years   Mammogram recommended starting age 40    Breast self  awareness encouraged  STD Screening   Testing desires swab.    Contraception :   Mirena IUD, placed in April 2017.  She is considering conception in the near future, recommend prenatal vitamins and make an appointment to remove  Family history    does not demonstrate need for genetics referral -reviewed question of surgery that her mom underwent in her 30s for an ovarian cyst.  She is uncertain if it was benign or malignant but no chemo and mom is living. Thinks benign  Healthy lifestyle counseling:   return for routine annual checkups        Return in about 1 year (around 3/26/2020) for Annual physical.

## 2019-03-28 LAB
C TRACH RRNA SPEC QL NAA+PROBE: NEGATIVE
N GONORRHOEA RRNA SPEC QL NAA+PROBE: NEGATIVE
T VAGINALIS RRNA VAG QL NAA+PROBE: NEGATIVE

## 2019-04-01 ENCOUNTER — TELEPHONE (OUTPATIENT)
Dept: OBSTETRICS AND GYNECOLOGY | Facility: CLINIC | Age: 29
End: 2019-04-01

## 2019-04-01 NOTE — TELEPHONE ENCOUNTER
----- Message from Sigrid Lomax MD sent at 3/29/2019  1:38 PM EDT -----  Please inform patient of negative gonorrhea/chlamydia/trichomonas testing    04/01/19  Pt informed of negative lala/jose manuel/tri

## 2019-05-02 ENCOUNTER — OFFICE VISIT (OUTPATIENT)
Dept: OBSTETRICS AND GYNECOLOGY | Facility: CLINIC | Age: 29
End: 2019-05-02

## 2019-05-02 ENCOUNTER — PROCEDURE VISIT (OUTPATIENT)
Dept: OBSTETRICS AND GYNECOLOGY | Facility: CLINIC | Age: 29
End: 2019-05-02

## 2019-05-02 VITALS
SYSTOLIC BLOOD PRESSURE: 125 MMHG | HEART RATE: 65 BPM | HEIGHT: 61 IN | DIASTOLIC BLOOD PRESSURE: 81 MMHG | WEIGHT: 161 LBS | BODY MASS INDEX: 30.4 KG/M2

## 2019-05-02 DIAGNOSIS — Z30.431 ENCOUNTER FOR ROUTINE CHECKING OF INTRAUTERINE CONTRACEPTIVE DEVICE (IUD): Primary | ICD-10-CM

## 2019-05-02 DIAGNOSIS — R10.2 PELVIC PAIN: Primary | ICD-10-CM

## 2019-05-02 DIAGNOSIS — Z97.5 IUD (INTRAUTERINE DEVICE) IN PLACE: ICD-10-CM

## 2019-05-02 DIAGNOSIS — R10.84 GENERALIZED ABDOMINAL PAIN: ICD-10-CM

## 2019-05-02 LAB
B-HCG UR QL: NEGATIVE
BILIRUB BLD-MCNC: NEGATIVE MG/DL
CLARITY, POC: CLEAR
COLOR UR: YELLOW
GLUCOSE UR STRIP-MCNC: NEGATIVE MG/DL
INTERNAL NEGATIVE CONTROL: NEGATIVE
INTERNAL POSITIVE CONTROL: POSITIVE
KETONES UR QL: NEGATIVE
LEUKOCYTE EST, POC: NEGATIVE
Lab: NORMAL
NITRITE UR-MCNC: NEGATIVE MG/ML
PH UR: 7.5 [PH] (ref 5–8)
PROT UR STRIP-MCNC: NEGATIVE MG/DL
RBC # UR STRIP: ABNORMAL /UL
SP GR UR: 1.02 (ref 1–1.03)
UROBILINOGEN UR QL: NORMAL

## 2019-05-02 PROCEDURE — 81025 URINE PREGNANCY TEST: CPT | Performed by: OBSTETRICS & GYNECOLOGY

## 2019-05-02 PROCEDURE — 99213 OFFICE O/P EST LOW 20 MIN: CPT | Performed by: OBSTETRICS & GYNECOLOGY

## 2019-05-02 PROCEDURE — 76830 TRANSVAGINAL US NON-OB: CPT | Performed by: OBSTETRICS & GYNECOLOGY

## 2019-05-02 RX ORDER — FLUOXETINE HYDROCHLORIDE 20 MG/1
CAPSULE ORAL
Refills: 1 | COMMUNITY
Start: 2019-03-21

## 2019-05-02 NOTE — PROGRESS NOTES
SUBJECTIVE:   Chief Complaint   Patient presents with   • Pelvic Pain     pt states she started spotting 2 weeks ago and has had period like pain like on lower abdomen and lower back but states she is not bleeding.         Brynn Bhatia is a 29 y.o.  who presents for pelvic pain of 2 weeks duration.  It started when her last menstrual cycle started.  This is just been light spotting.  Denies any new sexual partners or exposure to STDs.  Denies vaginal discharge.  Reports daily bowel movements.  Denies dysuria, diarrhea, nausea, vomiting.  Has not had fever.  Reports pain is throbbing and mostly in her lower back.  Occasionally feels that the lower aspect of her abdomen.  No alleviating or exacerbating factors.  It is associate with light spotting that has been going on the last 2 weeks which is unusual for her.  Typically with her IUD she has 3 to 5-day light menses    Past Medical History:   Diagnosis Date   • Asthma      History reviewed. No pertinent surgical history.  OB History    Para Term  AB Living   2 2 2     2   SAB TAB Ectopic Molar Multiple Live Births             2      # Outcome Date GA Lbr Klever/2nd Weight Sex Delivery Anes PTL Lv   2 Term     F Vag-Spont   ELISHA   1 Term     F Vag-Spont   ELISHA         Social History     Tobacco Use   • Smoking status: Never Smoker   • Smokeless tobacco: Never Used   Substance Use Topics   • Alcohol use: Yes     Comment: rare   • Drug use: No     Family History   Problem Relation Age of Onset   • Seizures Mother    • Cervical cancer Mother    • Ovarian cysts Mother         reports surgery in her 30's, no chemo, benign likely   • Cervical cancer Maternal Grandmother    • Breast cancer Neg Hx    • Uterine cancer Neg Hx    • Colon cancer Neg Hx    • Deep vein thrombosis Neg Hx    • Pulmonary embolism Neg Hx      Current Outpatient Medications on File Prior to Visit   Medication Sig Dispense Refill   • diazePAM (VALIUM) 5 MG tablet TK 1 T PO  D PRN  1   •  "docusate sodium (COLACE) 100 MG capsule Take 1 capsule by mouth 2 (Two) Times a Day As Needed for Constipation. 12 capsule 0   • FLUoxetine (PROzac) 20 MG capsule TK 1 C PO QD  1   • levonorgestrel (MIRENA, 52 MG,) 20 MCG/24HR IUD Mirena     • multivitamin (DAILY LANE) tablet tablet Take  by mouth Daily.  1   • VENTOLIN  (90 BASE) MCG/ACT inhaler USE 1 TO 2 PUFFS EVERY 4 H PRF WHEEZE OR SHORTNESS OF BREATH  0     Current Facility-Administered Medications on File Prior to Visit   Medication Dose Route Frequency Provider Last Rate Last Dose   • levonorgestrel (MIRENA) 20 MCG/24HR IUD   Intrauterine Once Codi Almodovar MD         Allergies   Allergen Reactions   • Sulfa Antibiotics Other (See Comments)     Pt states \"as a baby\"         Review of Systems   Constitutional: Negative for activity change, appetite change, fatigue, fever and unexpected weight change.   Gastrointestinal: Positive for abdominal pain. Negative for nausea and vomiting.   Genitourinary: Positive for menstrual problem, pelvic pain and vaginal bleeding. Negative for vaginal discharge and vaginal pain.   Musculoskeletal: Positive for back pain.   Hematological: Does not bruise/bleed easily.   Psychiatric/Behavioral: Negative for agitation.         OBJECTIVE:   Vitals:    05/02/19 1252   BP: 125/81   Pulse: 65   Weight: 73 kg (161 lb)   Height: 154.9 cm (60.98\")      Physical Exam   Constitutional: She is oriented to person, place, and time. She appears well-developed and well-nourished. No distress.   HENT:   Head: Normocephalic and atraumatic.   Eyes: EOM are normal. No scleral icterus.   Neck: Normal range of motion.   Cardiovascular: Normal rate and regular rhythm.   Pulmonary/Chest: Effort normal. No respiratory distress.   Abdominal: Soft. She exhibits no distension.   Genitourinary: Uterus normal and cervix normal. There is no rash, tenderness, lesion, injury or Bartholin's cyst on the right labia. There is no rash, tenderness, " lesion, injury or Bartholin's cyst on the left labia. Cervix does not exhibit motion tenderness. Right adnexum displays no mass, no tenderness and no fullness. Right adnexum is present.Left adnexum displays no mass, no tenderness and no fullness. Left adnexum is present.Vagina exhibits no lesion and no loss of rugae. No erythema, tenderness or bleeding in the vagina. No foreign body in the vagina. No signs of injury around the vagina. Vaginal discharge (copious, clear) found.   Genitourinary Comments: IUD strings seen   Musculoskeletal: Normal range of motion.   Neurological: She is alert and oriented to person, place, and time.   Skin: Skin is warm and dry. No rash noted. She is not diaphoretic. No erythema.   Psychiatric: She has a normal mood and affect. Her behavior is normal. Judgment and thought content normal.       ASSESSMENT/PLAN:     ICD-10-CM ICD-9-CM   1. Encounter for routine checking of intrauterine contraceptive device (IUD) Z30.431 V25.42   2. Generalized abdominal pain R10.84 789.07     Normal ultrasound with IUD in appropriate position. Pt with benign abdominal exam.  Will await Nu swab and treat as indicated  UA with blood but was taken post pelvic exam as pt could not give urine prior.  Will repeat if all testing normal and pain persists.    Return if symptoms worsen or fail to improve.

## 2019-05-05 LAB
A VAGINAE DNA VAG QL NAA+PROBE: ABNORMAL SCORE
BVAB2 DNA VAG QL NAA+PROBE: ABNORMAL SCORE
C ALBICANS DNA VAG QL NAA+PROBE: NEGATIVE
C GLABRATA DNA VAG QL NAA+PROBE: NEGATIVE
C TRACH RRNA SPEC QL NAA+PROBE: NEGATIVE
MEGA1 DNA VAG QL NAA+PROBE: ABNORMAL SCORE
N GONORRHOEA RRNA SPEC QL NAA+PROBE: NEGATIVE
T VAGINALIS RRNA SPEC QL NAA+PROBE: NEGATIVE

## 2019-05-06 ENCOUNTER — TELEPHONE (OUTPATIENT)
Dept: OBSTETRICS AND GYNECOLOGY | Facility: CLINIC | Age: 29
End: 2019-05-06

## 2019-05-06 RX ORDER — METRONIDAZOLE 500 MG/1
500 TABLET ORAL 2 TIMES DAILY
Qty: 14 TABLET | Refills: 0 | Status: SHIPPED | OUTPATIENT
Start: 2019-05-06 | End: 2019-05-13

## 2019-05-06 NOTE — TELEPHONE ENCOUNTER
Please call patient and let her know that her testing came back positive for bacterial vaginosis.  I will send flagyl to her pharmacy

## 2024-11-05 ENCOUNTER — PROCEDURE VISIT (OUTPATIENT)
Dept: NEUROLOGY | Facility: CLINIC | Age: 34
End: 2024-11-05
Payer: MEDICAID

## 2024-11-05 VITALS
HEIGHT: 61 IN | SYSTOLIC BLOOD PRESSURE: 114 MMHG | WEIGHT: 161 LBS | DIASTOLIC BLOOD PRESSURE: 77 MMHG | HEART RATE: 74 BPM | BODY MASS INDEX: 30.4 KG/M2

## 2024-11-05 DIAGNOSIS — R20.2 PARESTHESIAS IN RIGHT HAND: Primary | ICD-10-CM

## 2024-11-05 PROCEDURE — 95886 MUSC TEST DONE W/N TEST COMP: CPT | Performed by: PSYCHIATRY & NEUROLOGY

## 2024-11-05 PROCEDURE — 95908 NRV CNDJ TST 3-4 STUDIES: CPT | Performed by: PSYCHIATRY & NEUROLOGY
